# Patient Record
Sex: MALE | Race: OTHER | URBAN - METROPOLITAN AREA
[De-identification: names, ages, dates, MRNs, and addresses within clinical notes are randomized per-mention and may not be internally consistent; named-entity substitution may affect disease eponyms.]

---

## 2024-05-29 ENCOUNTER — OFFICE VISIT (OUTPATIENT)
Dept: OTOLARYNGOLOGY | Facility: CLINIC | Age: 24
End: 2024-05-29

## 2024-05-29 DIAGNOSIS — J34.2 NASAL SEPTAL DEVIATION: ICD-10-CM

## 2024-05-29 DIAGNOSIS — K11.1 SALIVARY GLAND HYPERTROPHY: Primary | ICD-10-CM

## 2024-05-29 PROCEDURE — 99203 OFFICE O/P NEW LOW 30 MIN: CPT | Performed by: SPECIALIST

## 2024-05-29 RX ORDER — ERGOCALCIFEROL 1.25 MG/1
50000 CAPSULE ORAL WEEKLY
COMMUNITY
Start: 2024-04-22

## 2024-05-29 NOTE — PATIENT INSTRUCTIONS
I think you should increase your fluids as tolerated by you for your enlarged minor salivary glands.  You can also try hard candy like jelly ranchers or lemon heads to increase your saliva.  Can also try sugar-free gum or candy to improve your saliva output.  If you have continued problems please follow-up.  You have an incidental right septal deviation.

## 2024-05-29 NOTE — PROGRESS NOTES
Jaswinder Arriola is a 23 year old male.   Chief Complaint   Patient presents with    Mouth/Lip Problem     Bottom lip lesion      HPI:   Patient here to check his lower lip.  Is a  and unable to drink much at work.  Did have a yellow mucus coming from the area of concern.    Current Outpatient Medications   Medication Sig Dispense Refill    ergocalciferol 1.25 MG (05970 UT) Oral Cap Take 1 capsule (50,000 Units total) by mouth once a week.        History reviewed. No pertinent past medical history.   Social History:  Social History     Socioeconomic History    Marital status: Single        REVIEW OF SYSTEMS:   GENERAL HEALTH: feels well otherwise  GENERAL : denies fever, chills, sweats, weight loss, weight gain  SKIN: denies any unusual skin lesions or rashes  RESPIRATORY: denies shortness of breath with exertion  NEURO: denies headaches    EXAM:   There were no vitals taken for this visit.  System Details   Skin Inspection - Normal.   Constitutional Overall appearance - Normal.   Head/Face Facial features - Normal. Eyebrows - Normal. Skull - Normal.   Eyes Conjunctiva - Right: Normal, Left: Normal. Pupil - Right: Normal, Left: Normal.    Ears Inspection - Right: Normal, Left: Normal.   Canal - Right: Normal, Left: Normal.   TM - Right: Normal, Left: Normal.   Nasal External nose - Normal.   Nasal septum -right septal deviation  Turbinates - Normal.   Oral/Oropharynx Lips -very prominent minor salivary glands.  No palpable tumor.  Tonsils - Normal, Tongue - Normal    Neck Exam Inspection - Normal. Palpation - Normal. Parotid gland - Normal. Thyroid gland - Normal.   Lymph Detail Submental. Submandibular. Anterior cervical. Posterior cervical. Supraclavicular all without enlargement   Psychiatric Orientation - Oriented to time, place, person & situation. Appropriate mood and affect.   Neurological Memory - Normal. Cranial nerves - Cranial nerves II through XII grossly intact.     ASSESSMENT AND  PLAN:   1. Salivary gland hypertrophy  Very prominent minor salivary glands likely secondary to due to dehydration.  I discussed increasing fluids as well as sialagogues.  Patient to call or follow-up with any additional questions or problems.    2.  Deviated nasal septum  Significant deviation to the right.      The patient indicates understanding of these issues and agrees to the plan.      Katheryn Pagan MD  5/29/2024  6:41 PM